# Patient Record
Sex: FEMALE | Race: BLACK OR AFRICAN AMERICAN | URBAN - METROPOLITAN AREA
[De-identification: names, ages, dates, MRNs, and addresses within clinical notes are randomized per-mention and may not be internally consistent; named-entity substitution may affect disease eponyms.]

---

## 2019-04-02 ENCOUNTER — HISTORICAL (OUTPATIENT)
Dept: ADMINISTRATIVE | Facility: HOSPITAL | Age: 24
End: 2019-04-02

## 2019-04-02 LAB
ABS NEUT (OLG): 6.47 X10(3)/MCL (ref 2.1–9.2)
BUN SERPL-MCNC: 10 MG/DL (ref 7–18)
CALCIUM SERPL-MCNC: 9 MG/DL (ref 8.5–10.1)
CHLORIDE SERPL-SCNC: 104 MMOL/L (ref 98–107)
CO2 SERPL-SCNC: 27 MMOL/L (ref 21–32)
CREAT SERPL-MCNC: 0.8 MG/DL (ref 0.55–1.02)
CREAT/UREA NIT SERPL: 12
ERYTHROCYTE [DISTWIDTH] IN BLOOD BY AUTOMATED COUNT: 12 % (ref 11.5–17)
GLUCOSE SERPL-MCNC: 96 MG/DL (ref 74–106)
HCT VFR BLD AUTO: 39.2 % (ref 37–47)
HGB BLD-MCNC: 12.7 GM/DL (ref 12–16)
MCH RBC QN AUTO: 28.9 PG (ref 27–31)
MCHC RBC AUTO-ENTMCNC: 32.4 GM/DL (ref 33–36)
MCV RBC AUTO: 89.1 FL (ref 80–94)
PLATELET # BLD AUTO: 308 X10(3)/MCL (ref 130–400)
PMV BLD AUTO: 9.5 FL (ref 7.4–10.4)
POTASSIUM SERPL-SCNC: 3.9 MMOL/L (ref 3.5–5.1)
RBC # BLD AUTO: 4.4 X10(6)/MCL (ref 4.2–5.4)
SODIUM SERPL-SCNC: 138 MMOL/L (ref 136–145)
WBC # SPEC AUTO: 9 X10(3)/MCL (ref 4.5–11.5)

## 2019-04-03 ENCOUNTER — HISTORICAL (OUTPATIENT)
Dept: SURGERY | Facility: HOSPITAL | Age: 24
End: 2019-04-03

## 2019-05-28 ENCOUNTER — HISTORICAL (OUTPATIENT)
Dept: ADMINISTRATIVE | Facility: HOSPITAL | Age: 24
End: 2019-05-28

## 2019-07-02 ENCOUNTER — HISTORICAL (OUTPATIENT)
Dept: ADMINISTRATIVE | Facility: HOSPITAL | Age: 24
End: 2019-07-02

## 2019-09-12 ENCOUNTER — HISTORICAL (OUTPATIENT)
Dept: RADIOLOGY | Facility: HOSPITAL | Age: 24
End: 2019-09-12

## 2019-10-10 ENCOUNTER — HISTORICAL (OUTPATIENT)
Dept: ADMINISTRATIVE | Facility: HOSPITAL | Age: 24
End: 2019-10-10

## 2022-04-11 ENCOUNTER — HISTORICAL (OUTPATIENT)
Dept: ADMINISTRATIVE | Facility: HOSPITAL | Age: 27
End: 2022-04-11

## 2022-04-24 VITALS
WEIGHT: 8.56 LBS | DIASTOLIC BLOOD PRESSURE: 79 MMHG | BODY MASS INDEX: 1.46 KG/M2 | SYSTOLIC BLOOD PRESSURE: 138 MMHG | HEIGHT: 64 IN

## 2022-05-05 NOTE — HISTORICAL [facility]ER
This is a historical note converted from Megan. Formatting and pictures may have been removed.  Please reference Megan for original formatting and attached multimedia. Chief Complaint  7.5 week s/p Left ankle surgery,sx-4/3/19--Gl--7/3/19. ?Xrays to be done today  History of Present Illness  She is doing well with no major issues or complaints.  Physical Exam  Vitals & Measurements  BP:?108/58?  HT:?163?cm? WT:?33.87?kg? BMI:?12.75?  Ankle Exam:  No obvious deformity. Plantar flexion and dorsiflexion is 60 degrees and 30 degrees, respectively. Negative squeeze test. Negative lateral malleolus tenderness. Negative medial malleolus tenderness. Negative talofibular ligament tenderness. Negative anterior draw and lateral tilt. 3/5 strength, normal skin appearance and palpable pulses distally. Sensibility normal.  Assessment/Plan  1.?S/P ORIF (open reduction internal fixation) fracture?Z96.7  ? She will continue?to do her home exercise. ?I will?have her wean herself off of the crutches over the next 1 to 2 weeks.? I will see her back in 4 weeks with repeat x-rays.  Ordered:  Post-Op follow-up visit [id number] PC, S/P ORIF (open reduction internal fixation) fracture  Closed left trimalleolar fracture, [facility], 05/28/19 16:07:00 CDT  ?  2.?Closed left trimalleolar fracture?S82.852A  Ordered:  Post-Op follow-up visit [id number] PC, S/P ORIF (open reduction internal fixation) fracture  Closed left trimalleolar fracture, [facility], 05/28/19 16:07:00 CDT  ?   Problem List/Past Medical History  Ongoing  Closed left trimalleolar fracture  Obesity  Historical  No qualifying data  Procedure/Surgical History  Injection, anesthetic agent; other peripheral nerve or branch (04/03/2019)  Introduction of Anesthetic Agent into Peripheral Nerves and Plexi, Percutaneous Approach (04/03/2019)  Open treatment of bimalleolar ankle fracture (eg, lateral and medial malleoli, or lateral and posterior malleoli, or medial and  posterior malleoli), includes internal fixation, when performed (04/03/2019)  Open treatment of distal tibiofibular joint (syndesmosis) disruption, includes internal fixation, when performed (04/03/2019)  ORIF Ankle (Left) (04/03/2019)  Reposition Left Ankle Joint with Internal Fixation Device, Open Approach (04/03/2019)  Reposition Left Tibia with Internal Fixation Device, Open Approach (04/03/2019)  Closed treatment of trimalleolar ankle fracture; with manipulation (03/25/2019)  Repair Left Hand Subcutaneous Tissue and Fascia, Open Approach (03/25/2019)  Reposition Left Fibula, External Approach (03/25/2019)  Reposition Left Tibia, External Approach (03/25/2019)  Simple repair of superficial wounds of scalp, neck, axillae, external genitalia, trunk and/or extremities (including hands and feet); 2.5 cm or less (03/25/2019)  [place] tooth   Medications  acetaminophen-oxycodone 325 mg-7.5 mg oral tablet, 1 tab(s), Oral, TID  aspirin 81 mg oral Delayed Release (EC) tablet, 81 mg= 1 tab(s), Oral, Daily,? ?Not taking  Norco 7.5 mg-325 mg oral tablet, 1 tab(s), Oral, q4hr, PRN,? ?Not taking  Phenergan 12.5 mg Tab, 12.5 mg= 1 tab(s), Oral, q6hr, PRN,? ?Not taking  sulfamethoxazole-trimethoprim 800 mg-160 mg oral tablet, 1 tab(s), Oral, BID,? ?Not Taking, Completed Rx  Zofran 4 mg oral tablet, 4 mg= 1 tab(s), Oral, q8hr, PRN  Allergies  No Known Allergies  Social History  Alcohol  Never, 04/02/2019  Employment/School  Employed, Student, 04/02/2019  Home/Environment  Lives with Siblings., 04/02/2019  Nutrition/Health  Regular, 04/02/2019  Substance Abuse  Past, 04/02/2019  Tobacco  Never (less than 100 in lifetime), No, 05/28/2019  Family History  Primary malignant neoplasm of breast: Mother.  Health Maintenance  Health Maintenance  ???Pending?(in the next year)  ??? ??OverDue  ??? ? ? ?Alcohol Misuse Screening due??01/01/19??and every 1??year(s)  ??? ??Due?  ??? ? ? ?ADL Screening due??05/28/19??and every 1??year(s)  ??? ?  ? ?Cervical Cancer Screening due??05/28/19??and every?  ??? ? ? ?Tetanus Vaccine due??05/28/19??and every 10??year(s)  ??? ??Due In Future?  ??? ? ? ?Obesity Screening not due until??01/01/20??and every 1??year(s)  ??? ? ? ?Depression Screening not due until??04/15/20??and every 1??year(s)  ??? ? ? ?Blood Pressure Screening not due until??05/27/20??and every 1??year(s)  ??? ? ? ?Body Mass Index Check not due until??05/27/20??and every 1??year(s)  ???Satisfied?(in the past 1 year)  ??? ??Satisfied?  ??? ? ? ?Blood Pressure Screening on??05/28/19.??Satisfied by Geeta Nunn  ??? ? ? ?Body Mass Index Check on??05/28/19.??Satisfied by Geeta Nunn  ??? ? ? ?Depression Screening on??04/16/19.??Satisfied by Yaritza Gil  ??? ? ? ?Diabetes Screening on??04/02/19.??Satisfied by Francisco Rapp  ??? ? ? ?Obesity Screening on??05/28/19.??Satisfied by Geeta Nunn  ?  ?

## 2022-05-05 NOTE — HISTORICAL [facility]
This is a historical note converted from [employer]. Formatting and pictures may have been removed.  Please reference [employer] for original formatting and attached multimedia. Chief Complaint  Left knee pain. Patient is wearing a left knee brace today.  History of Present Illness  This patient is progressing well with a left ankle fracture. ?The biggest issue she complains of today is the left knee.? She did have issues with left knee initially when she?initially saw me but?the acute ankle fracture, took precedence over the knee.? She complains a lot of pain in the posterior aspect of the knee and also somewhat in the front. [1]  9/17/2019 this patient comes in today to review her?left knee MRI?which shows a questionable?sprain to her ACL. [1]  10/10/2019 this patient states that she recently?remove the glass from her hand.  Review of Systems  GENERAL: No fevers, chills, unexpected weight loss or gain  MUSCULOSKELETAL: Joint pain, extremity pain [2]  Physical Exam  Vitals & Measurements  HR:?96(Peripheral)? BP:?138/79?  HT:?163?cm? WT:?33.87?kg? BMI:?1.46?  General: Well-developed, well-nourished.  Neuro: Alert and oriented x 3.  Psych: Normal mood and affect.  CV: Palpable radial pulses.  Resp: Smooth and unlabored.  Skin: No evidence of focal lesions or trauma.  Hem/Imm/Lymph: No evidence of lymphangitis or adenopathy.  Gait: No trendelenburg gait.  DTR: 2+, no hypo or hyperreflexia.  Coordination and Balance: No tremors or abnormal station.  Right?hand Exam:  No obvious deformity. No tenderness over long bones. Range of motion within functional limits. Normal skin appearance and palpable pulses. Sensibility normal.  Knee Exam:  No obvious deformity. Range of motion from 0-130 degrees. Negative patella grind and equal subluxation of knee cap medial and lateral < 1cm. Negative patella tendon tenderness. Negative Lachman and anterior drawer test. Negative posterior drawer test. Negative varus and valgus stress test.  Negative medial joint line tenderness. Negative lateral joint line tenderness. 5/5 strength and normal skin appearance. Sensibility normal. [3]  Assessment/Plan  1.?Foreign body of right hand?S60.551A  ?This patient was actually supposed to come today to have the foreign body removed from her hand. ?She states that she actually did this herself when she was in the bathtub they can see the glass in her hand.? She did receive x-rays today that did not show any more?glass of foreign body in her hand.? She can return back to her normal activities. ?She will come back to see me as needed.  Ordered:  Office/Outpatient Visit Level 3 Established [id number] PC, Foreign body of right hand  [facility] left knee, [facility], 10/10/19 15:42:00 CDT  ?  2.?Strain of left knee?S86.912A  Ordered:  Office/Outpatient Visit Level 3 Established [id number] PC, Foreign body of right hand  [facility] 15:42:00 CDT  ?  Orders:  XR Hand Right Minimum 3 Views, Routine, 10/10/19 15:12:00 CDT, Pain, None, Ambulatory, M25.572, Not Scheduled, 10/10/19 15:12:00 CDT   Problem List/Past Medical History  Ongoing  Closed left trimalleolar fracture  Obesity  Strain of left knee  Historical  No qualifying data  Procedure/Surgical History  Injection, anesthetic agent; other peripheral nerve or branch (04/03/2019)  Introduction of Anesthetic Agent into Peripheral Nerves and Plexi, Percutaneous Approach (04/03/2019)  Open treatment of bimalleolar ankle fracture (eg, lateral and medial malleoli, or lateral and posterior malleoli, or medial and posterior malleoli), includes internal fixation, when performed (04/03/2019)  Open treatment of distal tibiofibular joint (syndesmosis) disruption, includes internal fixation, when performed (04/03/2019)  ORIF Ankle (Left) (04/03/2019)  Reposition Left Ankle Joint with Internal Fixation Device, Open Approach (04/03/2019)  Reposition Left Tibia with Internal Fixation Device,  Open Approach (04/03/2019)  Closed treatment of trimalleolar ankle fracture; with manipulation (03/25/2019)  Repair Left Hand Subcutaneous Tissue and Fascia, Open Approach (03/25/2019)  Reposition Left Fibula, External Approach (03/25/2019)  Reposition Left Tibia, External Approach (03/25/2019)  Simple repair of superficial wounds of scalp, neck, axillae, external genitalia, trunk and/or extremities (including hands and feet); 2.5 cm or less (03/25/2019)  [place] tooth   Medications  acetaminophen-oxycodone 325 mg-7.5 mg oral tablet, 1 tab(s), Oral, TID,? ?Not taking  aspirin 81 mg oral Delayed Release (EC) tablet, 81 mg= 1 tab(s), Oral, Daily,? ?Not taking  Norco 7.5 mg-325 mg oral tablet, 1 tab(s), Oral, q4hr, PRN,? ?Not taking  Phenergan 12.5 mg Tab, 12.5 mg= 1 tab(s), Oral, q6hr, PRN,? ?Not taking  sulfamethoxazole-trimethoprim 800 mg-160 mg oral tablet, 1 tab(s), Oral, BID,? ?Not Taking, Completed Rx  Zofran 4 mg oral tablet, 4 mg= 1 tab(s), Oral, q8hr, PRN,? ?Not taking  Allergies  No Known Allergies  Social History  Abuse/Neglect  No, 10/10/2019  Alcohol  Never, 04/02/2019  Employment/School  Employed, Student, 04/02/2019  Home/Environment  Lives with Siblings., 04/02/2019  Nutrition/Health  Regular, 04/02/2019  Substance Use  Past, 04/02/2019  Tobacco  Never (less than 100 in lifetime), No, 10/10/2019  Family History  Primary malignant neoplasm of breast: Mother.  Health Maintenance  Health Maintenance  ???Pending?(in the next year)  ??? ??OverDue  ??? ? ? ?Alcohol Misuse Screening due??01/01/19??and every 1??year(s)  ??? ??Due?  ??? ? ? ?ADL Screening due??10/10/19??and every 1??year(s)  ??? ? ? ?Cervical Cancer Screening due??10/10/19??and every?  ??? ? ? ?Influenza Vaccine due??10/10/19??and every?  ??? ? ? ?Tetanus Vaccine due??10/10/19??and every 10??year(s)  ??? ??Due In Future?  ??? ? ? ?Obesity Screening not due until??01/01/20??and every 1??year(s)  ??? ? ? ?Depression Screening not due  until??04/15/20??and every 1??year(s)  ??? ? ? ?Blood Pressure Screening not due until??10/09/20??and every 1??year(s)  ??? ? ? ?Body Mass Index Check not due until??10/09/20??and every 1??year(s)  ???Satisfied?(in the past 1 year)  ??? ??Satisfied?  ??? ? ? ?Blood Pressure Screening on??10/10/19.??Satisfied by Myriam Roper  ??? ? ? ?Body Mass Index Check on??10/10/19.??Satisfied by Myriam Roper  ??? ? ? ?Depression Screening on??04/16/19.??Satisfied by Yaritza Gil  ??? ? ? ?Diabetes Screening on??04/02/19.??Satisfied by Fracnisco Rapp  ??? ? ? ?Obesity Screening on??10/10/19.??Satisfied by Myriam Roper  ?     [1]?Office Visit Note; Jatinder Coates MD 09/17/2019 17:09 CDT  [2]?Office Visit Note; Jatinder Coates MD 09/17/2019 17:09 CDT  [3] Office Visit Note; Jaxon ROMANO, Jatinder LAWLER 09/17/2019 17:09 CDT

## 2022-05-05 NOTE — HISTORICAL [facility]
This is a historical note converted from Megan. Formatting and pictures may have been removed.  Please reference Megan for original formatting and attached multimedia. Chief Complaint  S/P LEFT ANKLE ORIF. ?SX: 4/3/19. ?XRAY DONE TODAY. ?PT.HAS BEEN OFF THE CRUTCHES FOR 1 WK. ?SHE REPORTS OF SWELLING EVERYDAY TO LEFT ANKLE....SB  History of Present Illness  This patient is approximate 3 months from a?ankle fracture fixation. ?She is doing well other than some periodic swelling.? She still does walk with a limp.  Physical Exam  Vitals & Measurements  BP:?108/58?  HT:?163?cm? WT:?33.87?kg? BMI:?12.75?  Ankle Exam:  No obvious deformity. Plantar flexion and dorsiflexion is 60 degrees and 30 degrees, respectively. Negative squeeze test. Negative lateral malleolus tenderness. Negative medial malleolus tenderness. Negative talofibular ligament tenderness. Negative anterior draw and lateral tilt. 3+/5 strength, normal skin appearance and palpable pulses distally. Sensibility normal.  Assessment/Plan  1.?S/P ORIF (open reduction internal fixation) fracture?Z96.7  ?We will start physical therapy to help with balance and decrease the amount of limping is associated with this ankle.? I will see her back in 2 months.? This should be her last visit.  Ordered:  Post-Op follow-up visit [id number] PC, S/P ORIF (open reduction internal fixation) fracture  Closed left trimalleolar fracture, [facility], 07/02/19 11:04:00 CDT  ?  2.?Closed left trimalleolar fracture?S82.852A  Ordered:  Post-Op follow-up visit [id number] PC, S/P ORIF (open reduction internal fixation) fracture  [facility] 07/02/[facility] 11:04:00 CDT  ?   Problem List/Past Medical History  Ongoing  Closed left trimalleolar fracture  Obesity  Historical  No qualifying data  Procedure/Surgical History  Injection, anesthetic agent; other peripheral nerve or branch (04/03/2019)  Introduction of Anesthetic Agent into Peripheral  Nerves and Plexi, Percutaneous Approach (04/03/2019)  Open treatment of bimalleolar ankle fracture (eg, lateral and medial malleoli, or lateral and posterior malleoli, or medial and posterior malleoli), includes internal fixation, when performed (04/03/2019)  Open treatment of distal tibiofibular joint (syndesmosis) disruption, includes internal fixation, when performed (04/03/2019)  ORIF Ankle (Left) (04/03/2019)  Reposition Left Ankle Joint with Internal Fixation Device, Open Approach (04/03/2019)  Reposition Left Tibia with Internal Fixation Device, Open Approach (04/03/2019)  Closed treatment of trimalleolar ankle fracture; with manipulation (03/25/2019)  Repair Left Hand Subcutaneous Tissue and Fascia, Open Approach (03/25/2019)  Reposition Left Fibula, External Approach (03/25/2019)  Reposition Left Tibia, External Approach (03/25/2019)  Simple repair of superficial wounds of scalp, neck, axillae, external genitalia, trunk and/or extremities (including hands and feet); 2.5 cm or less (03/25/2019)  [place] tooth   Medications  acetaminophen-oxycodone 325 mg-7.5 mg oral tablet, 1 tab(s), Oral, TID,? ?Not taking  aspirin 81 mg oral Delayed Release (EC) tablet, 81 mg= 1 tab(s), Oral, Daily,? ?Not taking  Norco 7.5 mg-325 mg oral tablet, 1 tab(s), Oral, q4hr, PRN,? ?Not taking  Phenergan 12.5 mg Tab, 12.5 mg= 1 tab(s), Oral, q6hr, PRN,? ?Not taking  sulfamethoxazole-trimethoprim 800 mg-160 mg oral tablet, 1 tab(s), Oral, BID,? ?Not Taking, Completed Rx  Zofran 4 mg oral tablet, 4 mg= 1 tab(s), Oral, q8hr, PRN,? ?Not taking  Allergies  No Known Allergies  Social History  Alcohol  Never, 04/02/2019  Employment/School  Employed, Student, 04/02/2019  Home/Environment  Lives with Siblings., 04/02/2019  Nutrition/Health  Regular, 04/02/2019  Substance Use  Past, 04/02/2019  Tobacco  Never (less than 100 in lifetime), N/A, 07/02/2019  Family History  Primary malignant neoplasm of breast: Mother.  Health Maintenance  Health  Maintenance  ???Pending?(in the next year)  ??? ??OverDue  ??? ? ? ?Alcohol Misuse Screening due??01/01/19??and every 1??year(s)  ??? ??Due?  ??? ? ? ?ADL Screening due??07/02/19??and every 1??year(s)  ??? ? ? ?Cervical Cancer Screening due??07/02/19??and every?  ??? ? ? ?Tetanus Vaccine due??07/02/19??and every 10??year(s)  ??? ??Due In Future?  ??? ? ? ?Obesity Screening not due until??01/01/20??and every 1??year(s)  ??? ? ? ?Depression Screening not due until??04/15/20??and every 1??year(s)  ??? ? ? ?Blood Pressure Screening not due until??05/27/20??and every 1??year(s)  ??? ? ? ?Body Mass Index Check not due until??05/27/20??and every 1??year(s)  ???Satisfied?(in the past 1 year)  ??? ??Satisfied?  ??? ? ? ?Blood Pressure Screening on??07/02/19.??Satisfied by Yaritza Gil E.  ??? ? ? ?Body Mass Index Check on??07/02/19.??Satisfied by Yaritza Gil E.  ??? ? ? ?Depression Screening on??04/16/19.??Satisfied by Yaritza Gil E.  ??? ? ? ?Diabetes Screening on??04/02/19.??Satisfied by Francisco Rapp  ??? ? ? ?Obesity Screening on??07/02/19.??Satisfied by Yaritza Gil E.  ?